# Patient Record
Sex: MALE | Race: AMERICAN INDIAN OR ALASKA NATIVE
[De-identification: names, ages, dates, MRNs, and addresses within clinical notes are randomized per-mention and may not be internally consistent; named-entity substitution may affect disease eponyms.]

---

## 2018-08-27 ENCOUNTER — HOSPITAL ENCOUNTER (EMERGENCY)
Dept: HOSPITAL 42 - ED | Age: 28
Discharge: HOME | End: 2018-08-27
Payer: MEDICAID

## 2018-08-27 VITALS — RESPIRATION RATE: 18 BRPM | OXYGEN SATURATION: 98 %

## 2018-08-27 VITALS — BODY MASS INDEX: 23.6 KG/M2

## 2018-08-27 VITALS — SYSTOLIC BLOOD PRESSURE: 112 MMHG | TEMPERATURE: 98.6 F | DIASTOLIC BLOOD PRESSURE: 58 MMHG | HEART RATE: 60 BPM

## 2018-08-27 DIAGNOSIS — F17.210: ICD-10-CM

## 2018-08-27 DIAGNOSIS — M75.51: Primary | ICD-10-CM

## 2018-08-27 PROCEDURE — 99284 EMERGENCY DEPT VISIT MOD MDM: CPT

## 2018-08-27 PROCEDURE — 96372 THER/PROPH/DIAG INJ SC/IM: CPT

## 2018-08-27 PROCEDURE — 73030 X-RAY EXAM OF SHOULDER: CPT

## 2018-08-27 NOTE — ED PDOC
Arrival/HPI





- General


Chief Complaint: Upper Extremity Problem/Injury


Time Seen by Provider: 08/27/18 05:13


Historian: Patient





- History of Present Illness


Narrative History of Present Illness (Text): 


08/27/18 05:27


Chitra Mcdonnell is a 28 year old male who presents to the Emergency department 

complaining of right shoulder pain. Patient states he woke up this morning with 

right shoulder pain, worsened with movement. Patient denies any recent trauma/

injury, decreased range of motion, weakness/numbness/tingling in the extremity, 

back pain, neck pain, or any other complaints.


Symptom Onset: Gradual


Symptom Course: Unchanged


Activities at Onset: Light


Context: Home





Past Medical History





- Provider Review


Nursing Documentation Reviewed: Yes





- Infectious Disease


Hx of Infectious Diseases: None





- Tetanus Immunization


Tetanus Immunization: Unknown





- Psychiatric


Hx Depression: No


Hx Emotional Abuse: No


Hx Physical Abuse: No


Hx Substance Use: No





- Suicidal Assessment


Feels Threatened In Home Enviroment: No





Family/Social History





- Physician Review


Nursing Documentation Reviewed: Yes


Family/Social History: Unknown Family HX


Smoking Status: Light Smoker < 10 Cigarettes Daily


Hx Alcohol Use: Yes


Frequency of alcohol use: Socially


Hx Substance Use: No





Allergies/Home Meds


Allergies/Adverse Reactions: 


Allergies





No Known Allergies Allergy (Verified 07/01/13 06:46)


 











Review of Systems





- Physician Review


All systems were reviewed & negative as marked: Yes





- Review of Systems


Constitutional: Normal.  absent: Fevers


Eyes: Normal


ENT: Normal


Respiratory: Normal.  absent: SOB, Cough


Cardiovascular: Normal.  absent: Chest Pain


Gastrointestinal: Normal.  absent: Abdominal Pain, Diarrhea, Nausea, Vomiting


Genitourinary Male: Normal.  absent: Dysuria, Frequency, Hematuria, Urinary 

Output Changes


Musculoskeletal: Arthralgias (+right shoulder pain).  absent: Back Pain, Neck 

Pain


Skin: Normal.  absent: Rash


Neurological: Normal.  absent: Headache, Dizziness


Endocrine: Normal


Hemo/Lymphatic: Normal


Psychiatric: Normal





Physical Exam


Vital Signs Reviewed: Yes


Vital Signs











  Temp Pulse Resp BP Pulse Ox


 


 08/27/18 05:20  98.3 F  56 L  18  120/63  98











Temperature: Afebrile


Blood Pressure: Normal


Pulse: Regular


Respiratory Rate: Normal


Appearance: Positive for: Well-Appearing, Non-Toxic, Comfortable


Pain Distress: None


Mental Status: Positive for: Alert and Oriented X 3





- Systems Exam


Head: Present: Atraumatic, Normocephalic


Pupils: Present: PERRL


Extroacular Muscles: Present: EOMI


Conjunctiva: Present: Normal


Mouth: Present: Moist Mucous Membranes


Neck: Present: Normal Range of Motion


Respiratory/Chest: Present: Clear to Auscultation, Good Air Exchange.  No: 

Respiratory Distress, Accessory Muscle Use


Cardiovascular: Present: Regular Rate and Rhythm, Normal S1, S2.  No: Murmurs


Back: Present: Normal Inspection


Upper Extremity: Present: Normal ROM (Full ROM), NORMAL PULSES, Tenderness (

Discomfort with right shoulder abduction), Neurovascularly Intact, Capillary 

Refill < 2s, Other (No joint laxity).  No: Cyanosis, Edema, Swelling, Erythema, 

Temperature Abnormalties, Deformity


Lower Extremity: Present: Normal Inspection.  No: Edema


Neurological: Present: GCS=15, CN II-XII Intact, Speech Normal, Motor Func 

Grossly Intact, Normal Sensory Function, Normal Cerebellar Funct


Skin: Present: Warm, Dry, Normal Color.  No: Rashes


Psychiatric: Present: Alert, Oriented x 3, Normal Insight, Normal Concentration





Medical Decision Making


ED Course and Treatment: 


08/27/18 05:27


Impression:


28 year old male complaining of right shoulder pain after waking this morning.





Plan:


-- XR Right Shoulder


-- Toradol


-- Reassess and disposition





Progress Notes:


08/27/18 05:56


XR Right Shoulder reviewed, shows no acute processes.





- RAD Interpretation


Radiology Orders: 








08/27/18 05:28


SHOULDER RIGHT [RAD] Stat 











: ED Physician





- Medication Orders


Current Medication Orders: 











Discontinued Medications





Ketorolac Tromethamine (Toradol)  60 mg IM ONCE ONE


   Stop: 08/27/18 05:30


   Last Admin: 08/27/18 05:38  Dose: 60 mg





MAR Pain Assessment


 Document     08/27/18 05:38    (Rec: 08/27/18 05:41  [id number]


     Pain Reassessment


      Is this a pain reassessment?               Yes


     Sleep


      Is patient sleeping during reassessment?   No


     Presence of Pain


      Presence of Pain                           Yes


     Pain Scale Used


      Pain Scale Used                            Numeric


     Location


      Left, Right or Bilateral                   Right


      Pain Location Body Site                    Shoulder


     Description


      Description                                Constant


      Pain Behavior                              Rubbing Site


                                                 Facial Grimacing


IM Administration Charges


 Document     08/27/18 05:38    (Rec: 08/27/18 05:41  [id number]


     Injection Site


      MAR Injection Site                         Left Deltoid


     Charges for Administration


      # of IM Administrations                    1














- Scribe Statement


The provider has reviewed the documentation as recorded by the Cesar Renteria





Provider Hiroibe Attestation:


All medical record entries made by the Scribe were at my direction and 

personally dictated by me. I have reviewed the chart and agree that the record 

accurately reflects my personal performance of the history, physical exam, 

medical decision making, and the department course for this patient. I have 

also personally directed, reviewed, and agree with the discharge instructions 

and disposition.








Disposition/Present on Arrival





- Present on Arrival


Any Indicators Present on Arrival: No


History of DVT/PE: No


History of Uncontrolled Diabetes: No


Urinary Catheter: No


History of Decub. Ulcer: No


History Surgical Site Infection Following: None





- Disposition


Have Diagnosis and Disposition been Completed?: Yes


Diagnosis: 


 Shoulder bursitis





Disposition: HOME/ ROUTINE


Disposition Time: 06:12


Patient Plan: Discharge


Condition: GOOD


Discharge Instructions (ExitCare):  Shoulder Bursitis (DC)


Additional Instructions: 


Avoid excessive use of affected area/take meds as prescribed/follow up with 

your doctor/orthopedist this week


Prescriptions: 


Naproxen [Naprosyn] 500 mg PO BID PRN #14 tab


 PRN Reason: Pain


Forms:  [facility] (English)

## 2018-08-27 NOTE — RAD
Date of service: 



08/27/2018



PROCEDURE:  Radiographs of the Right Shoulder



HISTORY:

pain







COMPARISON:

No prior.



FINDINGS:



BONES:

Normal. No fracture.



JOINTS:

Normal. Glenohumeral and acromioclavicular joints preserved. No 

osteoarthritis.



SOFT TISSUES:

Minimal calcifications in the rotator cuff



OTHER FINDINGS:

None. 



IMPRESSION:

No acute findings